# Patient Record
Sex: MALE | URBAN - METROPOLITAN AREA
[De-identification: names, ages, dates, MRNs, and addresses within clinical notes are randomized per-mention and may not be internally consistent; named-entity substitution may affect disease eponyms.]

---

## 2022-03-16 ENCOUNTER — APPOINTMENT (RX ONLY)
Dept: URBAN - METROPOLITAN AREA CLINIC 15 | Facility: CLINIC | Age: 72
Setting detail: DERMATOLOGY
End: 2022-03-16

## 2022-03-16 DIAGNOSIS — L57.0 ACTINIC KERATOSIS: ICD-10-CM | Status: INADEQUATELY CONTROLLED

## 2022-03-16 DIAGNOSIS — L81.4 OTHER MELANIN HYPERPIGMENTATION: ICD-10-CM

## 2022-03-16 DIAGNOSIS — D22 MELANOCYTIC NEVI: ICD-10-CM

## 2022-03-16 PROBLEM — D48.5 NEOPLASM OF UNCERTAIN BEHAVIOR OF SKIN: Status: ACTIVE | Noted: 2022-03-16

## 2022-03-16 PROBLEM — D22.5 MELANOCYTIC NEVI OF TRUNK: Status: ACTIVE | Noted: 2022-03-16

## 2022-03-16 PROCEDURE — 17000 DESTRUCT PREMALG LESION: CPT

## 2022-03-16 PROCEDURE — 17003 DESTRUCT PREMALG LES 2-14: CPT

## 2022-03-16 PROCEDURE — ? LIQUID NITROGEN

## 2022-03-16 PROCEDURE — 99203 OFFICE O/P NEW LOW 30 MIN: CPT | Mod: 25

## 2022-03-16 PROCEDURE — ? COUNSELING

## 2022-03-16 PROCEDURE — ? DEFER

## 2022-03-16 PROCEDURE — ? FULL BODY SKIN EXAM

## 2022-03-16 ASSESSMENT — LOCATION DETAILED DESCRIPTION DERM
LOCATION DETAILED: RIGHT DISTAL DORSAL FOREARM
LOCATION DETAILED: LEFT DISTAL DORSAL FOREARM
LOCATION DETAILED: RIGHT SUPERIOR LATERAL UPPER BACK
LOCATION DETAILED: RIGHT INFERIOR CENTRAL MALAR CHEEK
LOCATION DETAILED: RIGHT PROXIMAL RADIAL DORSAL FOREARM

## 2022-03-16 ASSESSMENT — LOCATION SIMPLE DESCRIPTION DERM
LOCATION SIMPLE: RIGHT FOREARM
LOCATION SIMPLE: RIGHT CHEEK
LOCATION SIMPLE: LEFT FOREARM
LOCATION SIMPLE: RIGHT BACK

## 2022-03-16 ASSESSMENT — LOCATION ZONE DERM
LOCATION ZONE: ARM
LOCATION ZONE: TRUNK
LOCATION ZONE: FACE

## 2022-03-16 NOTE — PROCEDURE: LIQUID NITROGEN
Detail Level: Simple
Render Note In Bullet Format When Appropriate: No
Show Applicator Variable?: Yes
Duration Of Freeze Thaw-Cycle (Seconds): 4
Post-Care Instructions: I reviewed with the patient in detail post-care instructions. Patient is to wear sunprotection, and avoid picking at any of the treated lesions. Pt may apply Vaseline to crusted or scabbing areas.
Number Of Freeze-Thaw Cycles: 2 freeze-thaw cycles
Consent: The patient's consent was obtained including but not limited to risks of crusting, scabbing, blistering, scarring, darker or lighter pigmentary change, recurrence, incomplete removal and infection.

## 2022-05-10 ENCOUNTER — APPOINTMENT (RX ONLY)
Dept: URBAN - METROPOLITAN AREA CLINIC 15 | Facility: CLINIC | Age: 72
Setting detail: DERMATOLOGY
End: 2022-05-10

## 2022-05-10 DIAGNOSIS — M71 OTHER BURSOPATHIES: ICD-10-CM | Status: INADEQUATELY CONTROLLED

## 2022-05-10 PROBLEM — M71.342 OTHER BURSAL CYST, LEFT HAND: Status: ACTIVE | Noted: 2022-05-10

## 2022-05-10 PROBLEM — D48.5 NEOPLASM OF UNCERTAIN BEHAVIOR OF SKIN: Status: ACTIVE | Noted: 2022-05-10

## 2022-05-10 PROCEDURE — ? CURETTAGE AND DESTRUCTION WITH PATHOLOGY

## 2022-05-10 PROCEDURE — ? COUNSELING

## 2022-05-10 PROCEDURE — ? INTRALESIONAL KENALOG

## 2022-05-10 PROCEDURE — 11900 INJECT SKIN LESIONS </W 7: CPT | Mod: 59

## 2022-05-10 PROCEDURE — 17263 DSTRJ MAL LES T/A/L 2.1-3.0: CPT

## 2022-05-10 ASSESSMENT — LOCATION ZONE DERM: LOCATION ZONE: FINGER

## 2022-05-10 ASSESSMENT — LOCATION SIMPLE DESCRIPTION DERM: LOCATION SIMPLE: LEFT INDEX FINGER

## 2022-05-10 ASSESSMENT — LOCATION DETAILED DESCRIPTION DERM: LOCATION DETAILED: LEFT DISTAL DORSAL INDEX FINGER

## 2022-05-10 NOTE — PROCEDURE: INTRALESIONAL KENALOG
Detail Level: Simple
Validate Note Data When Using Inventory: Yes
X Size Of Lesion In Cm (Optional): 0
Medical Necessity Clause: This procedure was medically necessary because the lesions that were treated were:
Concentration Of Solution Injected (Mg/Ml): 10.0
Total Volume Injected (Ccs- Only Use Numbers And Decimals): .25
Kenalog Preparation: Kenalog
Consent: The risks of atrophy were reviewed with the patient.
Include Z78.9 (Other Specified Conditions Influencing Health Status) As An Associated Diagnosis?: No

## 2022-06-14 ENCOUNTER — APPOINTMENT (RX ONLY)
Dept: URBAN - METROPOLITAN AREA CLINIC 15 | Facility: CLINIC | Age: 72
Setting detail: DERMATOLOGY
End: 2022-06-14

## 2022-06-14 DIAGNOSIS — Z48.817 ENCOUNTER FOR SURGICAL AFTERCARE FOLLOWING SURGERY ON THE SKIN AND SUBCUTANEOUS TISSUE: ICD-10-CM

## 2022-06-14 PROCEDURE — ? COUNSELING

## 2022-06-14 PROCEDURE — ? POST-OP WOUND CHECK

## 2022-06-14 PROCEDURE — 99024 POSTOP FOLLOW-UP VISIT: CPT

## 2022-06-14 ASSESSMENT — LOCATION ZONE DERM
LOCATION ZONE: LEG
LOCATION ZONE: FINGER

## 2022-06-14 ASSESSMENT — LOCATION DETAILED DESCRIPTION DERM
LOCATION DETAILED: LEFT MID RADIAL DORSAL SMALL FINGER
LOCATION DETAILED: LEFT DISTAL DORSAL SMALL FINGER
LOCATION DETAILED: LEFT PROXIMAL PRETIBIAL REGION

## 2022-06-14 ASSESSMENT — LOCATION SIMPLE DESCRIPTION DERM
LOCATION SIMPLE: LEFT SMALL FINGER
LOCATION SIMPLE: LEFT PRETIBIAL REGION

## 2022-06-14 NOTE — PROCEDURE: POST-OP WOUND CHECK
Detail Level: Detailed
Add 40168 Cpt? (Important Note: In 2017 The Use Of 05358 Is Being Tracked By Cms To Determine Future Global Period Reimbursement For Global Periods): no
Add 93368 Cpt? (Important Note: In 2017 The Use Of 65931 Is Being Tracked By Cms To Determine Future Global Period Reimbursement For Global Periods): yes

## 2022-06-14 NOTE — PROCEDURE: MIPS QUALITY
Quality 110: Preventive Care And Screening: Influenza Immunization: Influenza Immunization previously received during influenza season
Quality 226: Preventive Care And Screening: Tobacco Use: Screening And Cessation Intervention: Patient screened for tobacco use and is an ex/non-smoker
Detail Level: Detailed
Quality 130: Documentation Of Current Medications In The Medical Record: Current Medications Documented
Quality 265: Biopsy Follow-Up: Biopsy results reviewed, communicated, tracked, and documented
Quality 431: Preventive Care And Screening: Unhealthy Alcohol Use - Screening: Patient not identified as an unhealthy alcohol user when screened for unhealthy alcohol use using a systematic screening method

## 2022-12-13 ENCOUNTER — APPOINTMENT (RX ONLY)
Dept: URBAN - METROPOLITAN AREA CLINIC 15 | Facility: CLINIC | Age: 72
Setting detail: DERMATOLOGY
End: 2022-12-13

## 2022-12-13 DIAGNOSIS — L82.1 OTHER SEBORRHEIC KERATOSIS: ICD-10-CM

## 2022-12-13 DIAGNOSIS — D22 MELANOCYTIC NEVI: ICD-10-CM

## 2022-12-13 DIAGNOSIS — L81.4 OTHER MELANIN HYPERPIGMENTATION: ICD-10-CM

## 2022-12-13 DIAGNOSIS — Z85.828 PERSONAL HISTORY OF OTHER MALIGNANT NEOPLASM OF SKIN: ICD-10-CM

## 2022-12-13 PROBLEM — C44.41 BASAL CELL CARCINOMA OF SKIN OF SCALP AND NECK: Status: ACTIVE | Noted: 2022-12-13

## 2022-12-13 PROBLEM — D22.5 MELANOCYTIC NEVI OF TRUNK: Status: ACTIVE | Noted: 2022-12-13

## 2022-12-13 PROCEDURE — ? COUNSELING

## 2022-12-13 PROCEDURE — 99213 OFFICE O/P EST LOW 20 MIN: CPT | Mod: 25

## 2022-12-13 PROCEDURE — ? SUNSCREEN RECOMMENDATIONS

## 2022-12-13 PROCEDURE — ? CURETTAGE AND DESTRUCTION WITH PATHOLOGY

## 2022-12-13 PROCEDURE — ? FULL BODY SKIN EXAM

## 2022-12-13 PROCEDURE — 17272 DSTR MAL LES S/N/H/F/G 1.1-2: CPT

## 2022-12-13 ASSESSMENT — LOCATION ZONE DERM
LOCATION ZONE: TRUNK
LOCATION ZONE: FACE

## 2022-12-13 ASSESSMENT — LOCATION SIMPLE DESCRIPTION DERM
LOCATION SIMPLE: LEFT UPPER BACK
LOCATION SIMPLE: LEFT CHEEK

## 2022-12-13 ASSESSMENT — LOCATION DETAILED DESCRIPTION DERM
LOCATION DETAILED: LEFT MEDIAL MALAR CHEEK
LOCATION DETAILED: LEFT SUPERIOR UPPER BACK
LOCATION DETAILED: LEFT INFERIOR MEDIAL UPPER BACK

## 2022-12-13 NOTE — PROCEDURE: CURETTAGE AND DESTRUCTION WITH PATHOLOGY
Detail Level: Detailed
Size Of Lesion In Cm: 1
Size Of Lesion After Curettage: 1.2
Anesthesia Type: 1% lidocaine with epinephrine
Anesthesia Volume In Cc: 0.5
Cautery Type: aluminum chloride
Number Of Curettages: 2
What Was Performed First?: Curettage
Additional Information: (Optional): The wound was cleaned, and a pressure dressing was applied.  The patient received detailed post-op instructions.
Lab: -[id number]
Lab Facility: [id number]
Histology Text: Following the procedure a portion of the curetted material was sent for histologic evaluation.
Biopsy Type: H and E
Render Path Notes In Note?: No
Consent was obtained from the patient. The risks, benefits and alternatives to therapy were discussed in detail. Specifically, the risks of infection, scarring, bleeding, prolonged wound healing, nerve injury, incomplete removal, allergy to anesthesia and recurrence were addressed. Alternatives to ED&C, such as: surgical removal and XRT were also discussed.  Prior to the procedure, the treatment site was clearly identified and confirmed by the patient. All components of Universal Protocol/PAUSE Rule completed.
Post-Care Instructions: I reviewed with the patient in detail post-care instructions. Patient is to keep the area dry for 48 hours, and not to engage in any swimming until the area is healed. Should the patient develop any fevers, chills, bleeding, severe pain patient will contact the office immediately.
Billing Type: Third-Party Bill
Bill As A Line Item Or As Units: Line Item

## 2022-12-13 NOTE — PROCEDURE: FULL BODY SKIN EXAM
Addended by: ASUNCION SMITH on: 2/27/2018 04:25 PM     Modules accepted: Orders    
Detail Level: Simple
Price (Do Not Change): 0.00
Instructions: This plan will send the code FBSE to the PM system.  DO NOT or CHANGE the price.

## 2023-12-19 ENCOUNTER — APPOINTMENT (RX ONLY)
Dept: URBAN - METROPOLITAN AREA CLINIC 15 | Facility: CLINIC | Age: 73
Setting detail: DERMATOLOGY
End: 2023-12-19

## 2023-12-19 VITALS — HEIGHT: 69 IN | WEIGHT: 193 LBS

## 2023-12-19 DIAGNOSIS — D18.0 HEMANGIOMA: ICD-10-CM

## 2023-12-19 DIAGNOSIS — L57.0 ACTINIC KERATOSIS: ICD-10-CM | Status: INADEQUATELY CONTROLLED

## 2023-12-19 DIAGNOSIS — D22 MELANOCYTIC NEVI: ICD-10-CM

## 2023-12-19 DIAGNOSIS — L82.1 OTHER SEBORRHEIC KERATOSIS: ICD-10-CM

## 2023-12-19 DIAGNOSIS — L81.4 OTHER MELANIN HYPERPIGMENTATION: ICD-10-CM

## 2023-12-19 DIAGNOSIS — Z85.828 PERSONAL HISTORY OF OTHER MALIGNANT NEOPLASM OF SKIN: ICD-10-CM

## 2023-12-19 PROBLEM — D22.5 MELANOCYTIC NEVI OF TRUNK: Status: ACTIVE | Noted: 2023-12-19

## 2023-12-19 PROBLEM — D18.01 HEMANGIOMA OF SKIN AND SUBCUTANEOUS TISSUE: Status: ACTIVE | Noted: 2023-12-19

## 2023-12-19 PROCEDURE — 17003 DESTRUCT PREMALG LES 2-14: CPT

## 2023-12-19 PROCEDURE — 17000 DESTRUCT PREMALG LESION: CPT

## 2023-12-19 PROCEDURE — ? LIQUID NITROGEN

## 2023-12-19 PROCEDURE — 99213 OFFICE O/P EST LOW 20 MIN: CPT | Mod: 25

## 2023-12-19 PROCEDURE — ? SUNSCREEN RECOMMENDATIONS

## 2023-12-19 PROCEDURE — ? COUNSELING

## 2023-12-19 PROCEDURE — ? FULL BODY SKIN EXAM

## 2023-12-19 ASSESSMENT — LOCATION DETAILED DESCRIPTION DERM
LOCATION DETAILED: LEFT SUPERIOR UPPER BACK
LOCATION DETAILED: EPIGASTRIC SKIN
LOCATION DETAILED: RIGHT PROXIMAL RADIAL DORSAL FOREARM
LOCATION DETAILED: LEFT INFERIOR MEDIAL UPPER BACK
LOCATION DETAILED: LEFT MEDIAL MALAR CHEEK
LOCATION DETAILED: LEFT SUPERIOR PARIETAL SCALP

## 2023-12-19 ASSESSMENT — LOCATION ZONE DERM
LOCATION ZONE: FACE
LOCATION ZONE: SCALP
LOCATION ZONE: TRUNK
LOCATION ZONE: ARM

## 2023-12-19 ASSESSMENT — LOCATION SIMPLE DESCRIPTION DERM
LOCATION SIMPLE: ABDOMEN
LOCATION SIMPLE: SCALP
LOCATION SIMPLE: RIGHT FOREARM
LOCATION SIMPLE: LEFT CHEEK
LOCATION SIMPLE: LEFT UPPER BACK

## 2023-12-19 NOTE — PROCEDURE: LIQUID NITROGEN
Post-Care Instructions: I reviewed with the patient in detail post-care instructions. Patient is to wear sunprotection, and avoid picking at any of the treated lesions. Pt may apply Vaseline to crusted or scabbing areas.
Duration Of Freeze Thaw-Cycle (Seconds): 4
Number Of Freeze-Thaw Cycles: 2 freeze-thaw cycles
Render Note In Bullet Format When Appropriate: No
Application Tool (Optional): Cry-AC
Show Aperture Variable?: Yes
Detail Level: Detailed
Consent: The patient's consent was obtained including but not limited to risks of crusting, scabbing, blistering, scarring, darker or lighter pigmentary change, recurrence, incomplete removal and infection.

## 2023-12-19 NOTE — PROCEDURE: MIPS QUALITY
Quality 110: Preventive Care And Screening: Influenza Immunization: Influenza Immunization Administered during Influenza season
Quality 431: Preventive Care And Screening: Unhealthy Alcohol Use - Screening: Patient not identified as an unhealthy alcohol user when screened for unhealthy alcohol use using a systematic screening method
Quality 111:Pneumonia Vaccination Status For Older Adults: Patient received any pneumococcal conjugate or polysaccharide vaccine on or after their 60th birthday and before the end of the measurement period
Detail Level: Detailed
Quality 130: Documentation Of Current Medications In The Medical Record: Current Medications Documented
Quality 226: Preventive Care And Screening: Tobacco Use: Screening And Cessation Intervention: Patient screened for tobacco use and is an ex/non-smoker

## 2024-12-10 ENCOUNTER — APPOINTMENT (OUTPATIENT)
Dept: URBAN - METROPOLITAN AREA CLINIC 15 | Facility: CLINIC | Age: 74
Setting detail: DERMATOLOGY
End: 2024-12-10

## 2024-12-10 DIAGNOSIS — L82.1 OTHER SEBORRHEIC KERATOSIS: ICD-10-CM

## 2024-12-10 DIAGNOSIS — L81.4 OTHER MELANIN HYPERPIGMENTATION: ICD-10-CM

## 2024-12-10 DIAGNOSIS — D18.0 HEMANGIOMA: ICD-10-CM

## 2024-12-10 DIAGNOSIS — Z85.828 PERSONAL HISTORY OF OTHER MALIGNANT NEOPLASM OF SKIN: ICD-10-CM

## 2024-12-10 DIAGNOSIS — D22 MELANOCYTIC NEVI: ICD-10-CM

## 2024-12-10 DIAGNOSIS — M71 OTHER BURSOPATHIES: ICD-10-CM | Status: INADEQUATELY CONTROLLED

## 2024-12-10 PROBLEM — M71.342 OTHER BURSAL CYST, LEFT HAND: Status: ACTIVE | Noted: 2024-12-10

## 2024-12-10 PROBLEM — D18.01 HEMANGIOMA OF SKIN AND SUBCUTANEOUS TISSUE: Status: ACTIVE | Noted: 2024-12-10

## 2024-12-10 PROBLEM — D22.5 MELANOCYTIC NEVI OF TRUNK: Status: ACTIVE | Noted: 2024-12-10

## 2024-12-10 PROCEDURE — ? COUNSELING

## 2024-12-10 PROCEDURE — ? FULL BODY SKIN EXAM

## 2024-12-10 PROCEDURE — 11900 INJECT SKIN LESIONS </W 7: CPT

## 2024-12-10 PROCEDURE — ? INTRALESIONAL KENALOG

## 2024-12-10 PROCEDURE — ? SUNSCREEN RECOMMENDATIONS

## 2024-12-10 PROCEDURE — 99213 OFFICE O/P EST LOW 20 MIN: CPT | Mod: 25

## 2024-12-10 ASSESSMENT — LOCATION DETAILED DESCRIPTION DERM
LOCATION DETAILED: LEFT DISTAL DORSAL INDEX FINGER
LOCATION DETAILED: LEFT SUPERIOR UPPER BACK
LOCATION DETAILED: EPIGASTRIC SKIN
LOCATION DETAILED: LEFT MEDIAL MALAR CHEEK
LOCATION DETAILED: LEFT INFERIOR MEDIAL UPPER BACK

## 2024-12-10 ASSESSMENT — LOCATION SIMPLE DESCRIPTION DERM
LOCATION SIMPLE: LEFT UPPER BACK
LOCATION SIMPLE: LEFT CHEEK
LOCATION SIMPLE: ABDOMEN
LOCATION SIMPLE: LEFT INDEX FINGER

## 2024-12-10 ASSESSMENT — LOCATION ZONE DERM
LOCATION ZONE: TRUNK
LOCATION ZONE: FINGER
LOCATION ZONE: FACE

## 2024-12-10 NOTE — PROCEDURE: INTRALESIONAL KENALOG
Lot # For Kenalog (Optional): [id number]
Detail Level: Detailed
Validate Note Data When Using Inventory: Yes
X Size Of Lesion In Cm (Optional): 0
Medical Necessity Clause: This procedure was medically necessary because the lesions that were treated were:
Expiration Date For Kenalog (Optional): 3-26
Concentration Of Kenalog Solution Injected (Mg/Ml): 10.0
Total Volume (Ccs): .25
Kenalog Preparation: Kenalog
Consent: The risks of atrophy were reviewed with the patient.
Ndc# For Kenalog Only: 5121-8927-03
Include Z78.9 (Other Specified Conditions Influencing Health Status) As An Associated Diagnosis?: No
Kenalog Type Of Vial: Single Dose
Which Kenalog Vial Was Used?: Kenalog 10 mg/ml (5 ml vial)
How Many Mls Were Removed From The 10 Mg/Ml (5ml) Vial When Preparing The Injectable Solution?: 0.3